# Patient Record
Sex: MALE | Race: WHITE | URBAN - METROPOLITAN AREA
[De-identification: names, ages, dates, MRNs, and addresses within clinical notes are randomized per-mention and may not be internally consistent; named-entity substitution may affect disease eponyms.]

---

## 2019-03-28 ENCOUNTER — Encounter (OUTPATIENT)
Dept: URBAN - METROPOLITAN AREA CLINIC 71 | Facility: CLINIC | Age: 64
End: 2019-03-28
Payer: MEDICARE

## 2019-03-28 PROCEDURE — 92083 EXTENDED VISUAL FIELD XM: CPT | Performed by: OPHTHALMOLOGY

## 2019-03-28 PROCEDURE — 99214 OFFICE O/P EST MOD 30 MIN: CPT | Performed by: OPHTHALMOLOGY

## 2019-10-09 ENCOUNTER — Encounter (OUTPATIENT)
Dept: URBAN - METROPOLITAN AREA CLINIC 71 | Facility: CLINIC | Age: 64
End: 2019-10-09
Payer: MEDICARE

## 2019-10-09 PROCEDURE — 99214 OFFICE O/P EST MOD 30 MIN: CPT | Performed by: OPHTHALMOLOGY

## 2019-10-09 PROCEDURE — 92133 CPTRZD OPH DX IMG PST SGM ON: CPT | Performed by: OPHTHALMOLOGY

## 2020-06-15 ENCOUNTER — OFFICE VISIT (OUTPATIENT)
Dept: URBAN - METROPOLITAN AREA CLINIC 71 | Facility: CLINIC | Age: 65
End: 2020-06-15
Payer: MEDICARE

## 2020-06-15 DIAGNOSIS — H18.833 RECURRENT EROSION OF CORNEA, BILATERAL: ICD-10-CM

## 2020-06-15 DIAGNOSIS — H43.813 VITREOUS DEGENERATION, BILATERAL: ICD-10-CM

## 2020-06-15 PROCEDURE — 99213 OFFICE O/P EST LOW 20 MIN: CPT | Performed by: OPHTHALMOLOGY

## 2020-06-15 PROCEDURE — 92083 EXTENDED VISUAL FIELD XM: CPT | Performed by: OPHTHALMOLOGY

## 2020-06-15 ASSESSMENT — INTRAOCULAR PRESSURE
OD: 14
OS: 11

## 2020-06-15 NOTE — IMPRESSION/PLAN
Impression: Dry eye syndrome of bilateral lacrimal glands: H04.123. Plan: OU: Discussed diagnosis in detail with patient.  Continue artificial tears

## 2020-06-15 NOTE — IMPRESSION/PLAN
Impression: Open angle with borderline findings, low risk, bilateral: H40.013. Stable. IOPs ok with no drops at this time. No progression seen on Visual field Plan: OU: Discussed diagnosis in detail with patient. No treatment is required at this time.  Will continue to monitor

## 2020-07-16 ENCOUNTER — OFFICE VISIT (OUTPATIENT)
Dept: URBAN - METROPOLITAN AREA CLINIC 71 | Facility: CLINIC | Age: 65
End: 2020-07-16
Payer: MEDICARE

## 2020-07-16 DIAGNOSIS — H40.013 OPEN ANGLE WITH BORDERLINE FINDINGS, LOW RISK, BILATERAL: ICD-10-CM

## 2020-07-16 PROCEDURE — 99212 OFFICE O/P EST SF 10 MIN: CPT | Performed by: OPHTHALMOLOGY

## 2020-07-16 ASSESSMENT — KERATOMETRY
OD: 43.13
OS: 43.25

## 2020-07-16 ASSESSMENT — INTRAOCULAR PRESSURE
OD: 16
OS: 13

## 2020-07-16 NOTE — IMPRESSION/PLAN
Impression: Open angle with borderline findings, low risk, bilateral: H40.013.  IOPs doing well today Plan: Continue to monitor

## 2020-07-16 NOTE — IMPRESSION/PLAN
Impression: Dry eye syndrome of bilateral lacrimal glands: H04.123. Plan: Advised patient of treatment options.  use artificial tears 3-4 times a day as needed for dryness

## 2020-07-16 NOTE — IMPRESSION/PLAN
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 Plan: Continue to monitor signs and symptoms. Continue [address] QHS.

## 2021-01-14 ENCOUNTER — OFFICE VISIT (OUTPATIENT)
Dept: URBAN - METROPOLITAN AREA CLINIC 71 | Facility: CLINIC | Age: 66
End: 2021-01-14
Payer: MEDICARE

## 2021-01-14 DIAGNOSIS — H04.123 DRY EYE SYNDROME OF BILATERAL LACRIMAL GLANDS: ICD-10-CM

## 2021-01-14 PROCEDURE — 99213 OFFICE O/P EST LOW 20 MIN: CPT | Performed by: OPHTHALMOLOGY

## 2021-01-14 PROCEDURE — 92133 CPTRZD OPH DX IMG PST SGM ON: CPT | Performed by: OPHTHALMOLOGY

## 2021-01-14 PROCEDURE — 68761 CLOSE TEAR DUCT OPENING: CPT | Performed by: OPHTHALMOLOGY

## 2021-01-14 PROCEDURE — 76514 ECHO EXAM OF EYE THICKNESS: CPT | Performed by: OPHTHALMOLOGY

## 2021-01-14 ASSESSMENT — INTRAOCULAR PRESSURE
OD: 14
OS: 13

## 2021-01-14 NOTE — IMPRESSION/PLAN
Impression: Dry eye syndrome of bilateral lacrimal glands: H04.123. plugs placed IN both LL 
softplugs [id number] 0.7mm OU  Plan: Discussed diagnosis in detail with patient. Patient instructed to call if condition gets worse. Patient instructed to apply warm compresses. Patient instructed to use artificial tears as needed.

## 2021-01-14 NOTE — IMPRESSION/PLAN
Impression: Open angle with borderline findings, low risk, bilateral: H40.013. OCT & PACHY ordered today. Plan: Discussed diagnosis in detail with patient. Will continue to observe condition and or symptoms. Will continue to monitor IOP.

## 2021-04-15 ENCOUNTER — OFFICE VISIT (OUTPATIENT)
Dept: URBAN - METROPOLITAN AREA CLINIC 71 | Facility: CLINIC | Age: 66
End: 2021-04-15
Payer: COMMERCIAL

## 2021-04-15 DIAGNOSIS — H52.4 PRESBYOPIA: Primary | ICD-10-CM

## 2021-04-15 PROCEDURE — 92014 COMPRE OPH EXAM EST PT 1/>: CPT | Performed by: OPTOMETRIST

## 2021-04-15 ASSESSMENT — INTRAOCULAR PRESSURE
OD: 19
OS: 18

## 2021-04-15 ASSESSMENT — VISUAL ACUITY
OS: 20/20
OD: 20/20

## 2021-04-15 NOTE — IMPRESSION/PLAN
Impression: Combined forms of age-related cataract, bilateral: H25.813. Plan: vision stable OU. Cataracts not visually significant at this time.  Will continue to monitor until vision/glare worsen

## 2021-04-15 NOTE — IMPRESSION/PLAN
Impression: Presbyopia: H52.4. Plan: Presbyopia is the inability to focus on objects (ie: accommodate) due to the loss of flexibility of your natural lens. Presbyopia occurs with age. Reading glasses, bifocals, trifocals or contacts can be helpful. Contact the office if difficulty focusing persists despite corrective eye wear. New glasses RX given today. PT interested in trying progressives. Advised patient of the peripheral distortion present in progressives and the adjustment period needed to get use to the change. After further discussion PT does not want to take the chance and will stick with his lined bifocals. Continue to follow annually for vision exams.

## 2021-04-15 NOTE — IMPRESSION/PLAN
Impression: Open angle with borderline findings, low risk, bilateral: H40.013. Plan: Due to enlarged C/D Ratio OU. low risk due to, No family HX of glaucoma. Normotensive IOP OU and thicker than average corneal thickness OU. No treatment recommended at this time.  Will continue to monitor

## 2021-04-15 NOTE — IMPRESSION/PLAN
Impression: Dry eye syndrome of bilateral lacrimal glands: H04.123. Discussed the next step if there is still no relief with his artificial tears and GEL GTTS would be a prescription medication. Discussed he will still need to use his OTC treatment consistently along with the prescription GTTS if he gets to that point. Plan: Dry eye syndrome requires lubrication of the eye with artificial tears and nighttime ointments or gels. In addition, topical and oral anti-inflammatory medications are usually necessary to treat the associated ocular irritation. Contact the office if dry eye does not improve, worsens or blurs vision. Recommend 3-4 drops daily of Systane Balance and Gel at bedtime.

## 2022-01-13 ENCOUNTER — OFFICE VISIT (OUTPATIENT)
Dept: URBAN - METROPOLITAN AREA CLINIC 71 | Facility: CLINIC | Age: 67
End: 2022-01-13
Payer: COMMERCIAL

## 2022-01-13 DIAGNOSIS — E11.9 TYPE 2 DIABETES MELLITUS WITHOUT COMPLICATIONS: Primary | ICD-10-CM

## 2022-01-13 DIAGNOSIS — D48.5 NEOPLASM OF UNCERTAIN BEHAVIOR OF SKIN: ICD-10-CM

## 2022-01-13 DIAGNOSIS — H40.011 OPEN ANGLE WITH BORDERLINE FINDINGS, LOW RISK, RIGHT EYE: ICD-10-CM

## 2022-01-13 DIAGNOSIS — H25.813 COMBINED FORMS OF AGE-RELATED CATARACT, BILATERAL: ICD-10-CM

## 2022-01-13 DIAGNOSIS — H02.055 TRICHIASIS WITHOUT ENTROPION LEFT LOWER EYELID: ICD-10-CM

## 2022-01-13 PROCEDURE — 92014 COMPRE OPH EXAM EST PT 1/>: CPT | Performed by: OPHTHALMOLOGY

## 2022-01-13 ASSESSMENT — KERATOMETRY
OS: 43.25
OD: 43.13

## 2022-01-13 ASSESSMENT — INTRAOCULAR PRESSURE
OS: 15
OD: 14

## 2022-01-13 NOTE — IMPRESSION/PLAN
Impression: Type 2 diabetes mellitus without complications: O79.5. No retinopathy Plan: Discussed. Continue to manage diabetes with PCP. Contact office if any new or worsening issues.  Continue to monitor annually

## 2022-01-13 NOTE — IMPRESSION/PLAN
Impression: Open angle with borderline findings, low risk, right eye: H40.011. IOP doing well. Nerve appears healthy Plan: Discussed. No treatment needed at this time.  Will continue to monitor

## 2022-01-13 NOTE — IMPRESSION/PLAN
Impression: Dry eye syndrome of bilateral lacrimal glands: H04.123.  Plan: Recommend warm compresses and ATs a couple times a day for dryness

## 2022-01-13 NOTE — IMPRESSION/PLAN
Impression: Combined forms of age-related cataract, bilateral: H25.813. Plan: Discussed. No treatment recommended at this time.  Will continue to monitor until cataracts become more bothersome

## 2022-01-13 NOTE — IMPRESSION/PLAN
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 Misdirected lashes removed with forceps without any complications. Plan: Discussed. Pt would like removed in clinic.  Contact office if any recurrence

## 2022-01-13 NOTE — IMPRESSION/PLAN
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 Plan: Discussed. Will remove and have biopsied at next visit.

## 2022-08-22 ENCOUNTER — OFFICE VISIT (OUTPATIENT)
Dept: URBAN - METROPOLITAN AREA CLINIC 71 | Facility: CLINIC | Age: 67
End: 2022-08-22
Payer: COMMERCIAL

## 2022-08-22 DIAGNOSIS — H40.013 OPEN ANGLE WITH BORDERLINE FINDINGS, LOW RISK, BILATERAL: Primary | ICD-10-CM

## 2022-08-22 DIAGNOSIS — D48.5 NEOPLASM OF UNCERTAIN BEHAVIOR OF SKIN: ICD-10-CM

## 2022-08-22 DIAGNOSIS — H25.813 COMBINED FORMS OF AGE-RELATED CATARACT, BILATERAL: ICD-10-CM

## 2022-08-22 DIAGNOSIS — E11.9 TYPE 2 DIABETES MELLITUS WITHOUT COMPLICATIONS: ICD-10-CM

## 2022-08-22 DIAGNOSIS — H04.123 DRY EYE SYNDROME OF BILATERAL LACRIMAL GLANDS: ICD-10-CM

## 2022-08-22 PROCEDURE — 99213 OFFICE O/P EST LOW 20 MIN: CPT | Performed by: OPHTHALMOLOGY

## 2022-08-22 PROCEDURE — 92083 EXTENDED VISUAL FIELD XM: CPT | Performed by: OPHTHALMOLOGY

## 2022-08-22 PROCEDURE — 92133 CPTRZD OPH DX IMG PST SGM ON: CPT | Performed by: OPHTHALMOLOGY

## 2022-08-22 ASSESSMENT — INTRAOCULAR PRESSURE
OD: 14
OS: 14

## 2022-08-22 NOTE — IMPRESSION/PLAN
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 Plan: Will wait for the blepharitis to clear up before we do any removal of the papilloma.

## 2022-08-22 NOTE — IMPRESSION/PLAN
Impression: Type 2 diabetes mellitus without complications: L41.1. Plan: Diabetes type II: no background retinopathy, no signs of neovascularization noted. Discussed ocular and systemic benefits of blood sugar control.

## 2023-03-01 ENCOUNTER — OFFICE VISIT (OUTPATIENT)
Dept: URBAN - METROPOLITAN AREA CLINIC 71 | Facility: CLINIC | Age: 68
End: 2023-03-01
Payer: COMMERCIAL

## 2023-03-01 DIAGNOSIS — E11.9 TYPE 2 DIABETES MELLITUS W/O COMPLICATION: ICD-10-CM

## 2023-03-01 DIAGNOSIS — H40.013 OPEN ANGLE WITH BORDERLINE FINDINGS, LOW RISK, BILATERAL: Primary | ICD-10-CM

## 2023-03-01 DIAGNOSIS — H02.055 TRICHIASIS WITHOUT ENTROPION LEFT LOWER EYELID: ICD-10-CM

## 2023-03-01 DIAGNOSIS — H18.833 RECURRENT EROSION OF CORNEA, BILATERAL: ICD-10-CM

## 2023-03-01 PROCEDURE — 99213 OFFICE O/P EST LOW 20 MIN: CPT | Performed by: OPHTHALMOLOGY

## 2023-03-01 PROCEDURE — 67820 REVISE EYELASHES: CPT | Performed by: OPHTHALMOLOGY

## 2023-03-01 PROCEDURE — 92071 CONTACT LENS FITTING FOR TX: CPT | Performed by: OPHTHALMOLOGY

## 2023-03-01 ASSESSMENT — INTRAOCULAR PRESSURE
OD: 9
OS: 11

## 2023-03-01 NOTE — IMPRESSION/PLAN
Impression: Recurrent erosion of cornea, bilateral: X31.539. Plan: Discussed with the patient, recommend using [id number] drops instead of the ointment. Will place a bandage contact lens in both eyes and recheck after 4 weeks to see if helps with the irritation.

## 2023-03-01 NOTE — IMPRESSION/PLAN
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 Plan: Removed with forceps in clinic.

## 2023-05-01 ENCOUNTER — OFFICE VISIT (OUTPATIENT)
Dept: URBAN - METROPOLITAN AREA CLINIC 71 | Facility: CLINIC | Age: 68
End: 2023-05-01
Payer: COMMERCIAL

## 2023-05-01 DIAGNOSIS — H01.009 BLEPHARITIS OF EYELID: Primary | ICD-10-CM

## 2023-05-01 DIAGNOSIS — E11.9 TYPE 2 DIABETES MELLITUS W/O COMPLICATION: ICD-10-CM

## 2023-05-01 DIAGNOSIS — H40.013 OPEN ANGLE WITH BORDERLINE FINDINGS, LOW RISK, BILATERAL: ICD-10-CM

## 2023-05-01 DIAGNOSIS — H18.833 RECURRENT EROSION OF CORNEA, BILATERAL: ICD-10-CM

## 2023-05-01 PROCEDURE — 99213 OFFICE O/P EST LOW 20 MIN: CPT | Performed by: OPHTHALMOLOGY

## 2023-05-01 RX ORDER — FLUOROMETHOLONE 1 MG/ML
0.1 % SOLUTION/ DROPS OPHTHALMIC
Qty: 5 | Refills: 0 | Status: ACTIVE
Start: 2023-05-01

## 2023-05-01 RX ORDER — ERYTHROMYCIN 5 MG/G
OINTMENT OPHTHALMIC
Qty: 5 | Refills: 1 | Status: ACTIVE
Start: 2023-05-01

## 2023-05-01 ASSESSMENT — INTRAOCULAR PRESSURE
OS: 14
OD: 15

## 2023-05-01 NOTE — IMPRESSION/PLAN
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 Plan: Discussed. Patient has used everything from the compress and the lid scrubs to no avail. Start erythromycin ointment to lids every night, fluorometholone once a day every morning. Use the lid wipes twice a day.

## 2023-06-12 ENCOUNTER — OFFICE VISIT (OUTPATIENT)
Dept: URBAN - METROPOLITAN AREA CLINIC 71 | Facility: CLINIC | Age: 68
End: 2023-06-12
Payer: COMMERCIAL

## 2023-06-12 DIAGNOSIS — E11.9 TYPE 2 DIABETES MELLITUS W/O COMPLICATION: ICD-10-CM

## 2023-06-12 DIAGNOSIS — H01.009 BLEPHARITIS OF EYELID: Primary | ICD-10-CM

## 2023-06-12 DIAGNOSIS — H04.123 DRY EYE SYNDROME OF BILATERAL LACRIMAL GLANDS: ICD-10-CM

## 2023-06-12 PROCEDURE — 99213 OFFICE O/P EST LOW 20 MIN: CPT | Performed by: OPHTHALMOLOGY

## 2023-06-12 RX ORDER — ERYTHROMYCIN 5 MG/G
OINTMENT OPHTHALMIC
Qty: 5 | Refills: 1 | Status: ACTIVE
Start: 2023-06-12

## 2023-06-12 RX ORDER — FLUOROMETHOLONE 1 MG/ML
0.1 % SOLUTION/ DROPS OPHTHALMIC
Qty: 5 | Refills: 1 | Status: INACTIVE
Start: 2023-06-12 | End: 2023-06-15

## 2023-06-12 ASSESSMENT — INTRAOCULAR PRESSURE
OS: 12
OD: 13

## 2023-06-12 NOTE — IMPRESSION/PLAN
Impression: Dry eye syndrome of bilateral lacrimal glands: H04.123. Plan: Recommend using the artificial tears throughout the day.

## 2023-06-12 NOTE — IMPRESSION/PLAN
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 Plan: Continue erythromycin QHS OU, lid scrubs. Decrease FML to 3 times a week OU. Patient to return in 3 months for a recheck.

## 2023-09-27 ENCOUNTER — OFFICE VISIT (OUTPATIENT)
Dept: URBAN - METROPOLITAN AREA CLINIC 71 | Facility: CLINIC | Age: 68
End: 2023-09-27
Payer: COMMERCIAL

## 2023-09-27 DIAGNOSIS — H01.009 BLEPHARITIS OF EYELID: Primary | ICD-10-CM

## 2023-09-27 PROCEDURE — 99213 OFFICE O/P EST LOW 20 MIN: CPT | Performed by: OPHTHALMOLOGY

## 2023-09-27 RX ORDER — FLUOROMETHOLONE 1 MG/ML
0.1 % SOLUTION/ DROPS OPHTHALMIC
Qty: 5 | Refills: 1 | Status: ACTIVE
Start: 2023-09-27

## 2023-09-27 ASSESSMENT — INTRAOCULAR PRESSURE
OS: 13
OD: 15

## 2024-01-08 ENCOUNTER — OFFICE VISIT (OUTPATIENT)
Dept: URBAN - METROPOLITAN AREA CLINIC 71 | Facility: CLINIC | Age: 69
End: 2024-01-08
Payer: COMMERCIAL

## 2024-01-08 DIAGNOSIS — E11.9 TYPE 2 DIABETES MELLITUS W/O COMPLICATION: ICD-10-CM

## 2024-01-08 DIAGNOSIS — H25.813 COMBINED FORMS OF AGE-RELATED CATARACT, BILATERAL: ICD-10-CM

## 2024-01-08 DIAGNOSIS — H01.009 BLEPHARITIS OF EYELID: Primary | ICD-10-CM

## 2024-01-08 DIAGNOSIS — H40.013 OPEN ANGLE WITH BORDERLINE FINDINGS, LOW RISK, BILATERAL: ICD-10-CM

## 2024-01-08 PROCEDURE — 92134 CPTRZ OPH DX IMG PST SGM RTA: CPT | Performed by: OPHTHALMOLOGY

## 2024-01-08 PROCEDURE — 92133 CPTRZD OPH DX IMG PST SGM ON: CPT | Performed by: OPHTHALMOLOGY

## 2024-01-08 PROCEDURE — 99213 OFFICE O/P EST LOW 20 MIN: CPT | Performed by: OPHTHALMOLOGY

## 2024-01-08 ASSESSMENT — INTRAOCULAR PRESSURE
OS: 20
OD: 20
OD: 18

## 2024-01-17 ENCOUNTER — OFFICE VISIT (OUTPATIENT)
Dept: URBAN - METROPOLITAN AREA CLINIC 71 | Facility: CLINIC | Age: 69
End: 2024-01-17

## 2024-01-17 DIAGNOSIS — H52.4 PRESBYOPIA: Primary | ICD-10-CM

## 2024-01-17 ASSESSMENT — INTRAOCULAR PRESSURE
OD: 15
OS: 13

## 2024-01-17 ASSESSMENT — VISUAL ACUITY
OD: 20/20
OS: 20/20

## 2024-07-08 ENCOUNTER — OFFICE VISIT (OUTPATIENT)
Dept: URBAN - METROPOLITAN AREA CLINIC 71 | Facility: CLINIC | Age: 69
End: 2024-07-08
Payer: COMMERCIAL

## 2024-07-08 DIAGNOSIS — H04.123 DRY EYE SYNDROME OF BILATERAL LACRIMAL GLANDS: ICD-10-CM

## 2024-07-08 DIAGNOSIS — B88.0 DERMATITIS DUE TO DEMODEX SPECIES: ICD-10-CM

## 2024-07-08 DIAGNOSIS — H01.009 BLEPHARITIS OF EYELID: Primary | ICD-10-CM

## 2024-07-08 PROCEDURE — 99213 OFFICE O/P EST LOW 20 MIN: CPT | Performed by: OPHTHALMOLOGY

## 2024-07-08 RX ORDER — AZITHROMYCIN MONOHYDRATE 10 MG/ML
1 % SOLUTION/ DROPS OPHTHALMIC
Qty: 2.5 | Refills: 0 | Status: ACTIVE
Start: 2024-07-08

## 2024-07-08 ASSESSMENT — INTRAOCULAR PRESSURE
OS: 14
OD: 14

## 2024-12-04 ENCOUNTER — OFFICE VISIT (OUTPATIENT)
Dept: URBAN - METROPOLITAN AREA CLINIC 71 | Facility: CLINIC | Age: 69
End: 2024-12-04
Payer: COMMERCIAL

## 2024-12-04 DIAGNOSIS — H01.009 BLEPHARITIS OF EYELID: Primary | ICD-10-CM

## 2024-12-04 DIAGNOSIS — H25.813 COMBINED FORMS OF AGE-RELATED CATARACT, BILATERAL: ICD-10-CM

## 2024-12-04 DIAGNOSIS — H04.123 DRY EYE SYNDROME OF BILATERAL LACRIMAL GLANDS: ICD-10-CM

## 2024-12-04 PROCEDURE — 99213 OFFICE O/P EST LOW 20 MIN: CPT | Performed by: OPHTHALMOLOGY

## 2024-12-04 RX ORDER — AZITHROMYCIN MONOHYDRATE 10 MG/ML
1 % SOLUTION/ DROPS OPHTHALMIC
Qty: 2.5 | Refills: 3 | Status: ACTIVE
Start: 2024-12-04

## 2024-12-04 ASSESSMENT — INTRAOCULAR PRESSURE
OS: 13
OD: 13

## 2025-03-03 ENCOUNTER — OFFICE VISIT (OUTPATIENT)
Dept: URBAN - METROPOLITAN AREA CLINIC 71 | Facility: CLINIC | Age: 70
End: 2025-03-03
Payer: COMMERCIAL

## 2025-03-03 DIAGNOSIS — H04.123 DRY EYE SYNDROME OF BILATERAL LACRIMAL GLANDS: ICD-10-CM

## 2025-03-03 DIAGNOSIS — H25.813 COMBINED FORMS OF AGE-RELATED CATARACT, BILATERAL: ICD-10-CM

## 2025-03-03 DIAGNOSIS — H01.009 BLEPHARITIS OF EYELID: Primary | ICD-10-CM

## 2025-03-03 RX ORDER — MINOCYCLINE HYDROCHLORIDE 50 MG/1
50 MG CAPSULE ORAL
Qty: 30 | Refills: 2 | Status: ACTIVE
Start: 2025-03-03

## 2025-03-03 ASSESSMENT — INTRAOCULAR PRESSURE
OS: 14
OD: 15